# Patient Record
Sex: FEMALE | Race: WHITE
[De-identification: names, ages, dates, MRNs, and addresses within clinical notes are randomized per-mention and may not be internally consistent; named-entity substitution may affect disease eponyms.]

---

## 2017-03-20 NOTE — PCM.PREANE
Preanesthetic Assessment





- Procedure


Proposed Procedure: 





term pregnancy in active labor. Requested labor epidural by RN per Dr. Carcamo 

order.








- Anesthesia/Transfusion/Family Hx


Anesthesia History: Prior Anesthesia Without Reaction


Family History of Anesthesia Reaction: No


Transfusion History: No Prior Transfusion(s)


Intubation History: Unknown





- Review of Systems


General: Other (labor pains)


Pulmonary: No Symptoms


Cardiovascular: No Symptoms


Gastrointestinal: No symptoms, Other (hx of GERD with pregnancy)


Neurological: No Symptoms


Other: Reports: Thyroid Problems (hypothyroid on syntroid)





- Physical Assessment


Height: 5 ft 3 in


Weight: 130 lb


ASA Class: 2E


ROM/Head Extension: Full


Lungs: Normal respiratory effort


Cardiovascular: Regular Rate





- Lab


Values: 





 Laboratory Last Values











WBC  11.35 K/uL (4.0-11.0)  H  03/20/17  21:42    


 


RBC  4.23 M/uL (4.30-5.90)  L  03/20/17  21:42    


 


Hgb  13.9 g/dL (12.0-16.0)   03/20/17  21:42    


 


Hct  39.8 % (36.0-46.0)   03/20/17  21:42    


 


MCV  94.1 fL (80.0-98.0)   03/20/17  21:42    


 


MCH  32.9 pg (27.0-32.0)  H  03/20/17  21:42    


 


MCHC  34.9 g/dL (31.0-37.0)   03/20/17  21:42    


 


RDW Std Deviation  47.2 fl (28.0-62.0)   03/20/17  21:42    


 


RDW Coeff of Feng  14 % (11.0-15.0)   03/20/17  21:42    


 


Plt Count  142 K/uL (150-400)  L  03/20/17  21:42    


 


MPV  10.40 fL (7.40-12.00)   03/20/17  21:42    


 


Nucleated RBC %  0.0 /100WBC  03/20/17  21:42    


 


Nucleated RBCs #  0 K/uL  03/20/17  21:42    


 


POC Glucose  80 mg/dL ()   03/20/17  22:45    


 


Blood Type  O POSITIVE   03/20/17  21:42    


 


Antibody Screen  NEGATIVE   03/20/17  21:42    














- Allergies


Allergies/Adverse Reactions: 


 Allergies











Allergy/AdvReac Type Severity Reaction Status Date / Time


 


amoxicillin [Amoxicillin] Allergy  Hives Verified 03/20/17 21:32














- Blood


Blood Available: No


Product(s) Available: None





- Acknowledgements


Anesthesia Type Planned: Regional Block


Pt an Appropriate Candidate for the Planned Anesthesia: Yes


Alternatives and Risks of Anesthesia Discussed w Pt/Guardian: Yes


Pt/Guardian Understands and Agrees with Anesthesia Plan: Yes





PreAnesthesia Questionnaire


OB/GYN History: Reports: Pregnancy, Other (see below)


Other OB/BYN History: D & C x1


Endocrine/Metabolic History: Reports: Diabetes, gestational, Hypothyroidism





- Past Surgical History


HEENT Surgical History: Reports: Tonsillectomy





- SUBSTANCE USE


Smoking Status *Q: Never Smoker





- HOME MEDS


Home Medications: 


 Home Meds





Levothyroxine 112 mcg PO ACBREAKFAST 03/20/17 [History]


Prenatal Vit W-Ca,Fe,FA(<1 mg) [Prenatal Vitamins] 1 tab PO DAILY 03/20/17 [

History]











- CURRENT (IN HOUSE) MEDS


Current Meds: 





 Current Medications





Butorphanol Tartrate (Stadol)  1 mg IVPUSH Q1H PRN


   PRN Reason: Pain


Carboprost Tromethamine (Hemabate Ds)  250 mcg IM ASDIRECTED PRN


   PRN Reason: Post Partum Hemorrhage


Cefazolin Sodium (Ancef)  1 gm IM Q8H [facility]IAN[facility]


Cefazolin Sodium (Ancef)  1 gm IM Q8H MARIANNA


Lactated Ringer's (Ringers, Lactated)  1,000 mls @ 150 mls/hr IV ASDIRECTED MARIANNA


   Last Admin: 03/20/17 23:01 Dose:  999 mls/hr


Lidocaine HCl (Xylocaine 1%)  50 ml INJECT .ONCE PRN


   PRN Reason: Laceration repair


Methylergonovine Maleate (Methergine)  0.2 mg IM ASDIRECTED PRN


   PRN Reason: Post Partum Hemorrhage


Misoprostol (Cytotec)  200 mcg PO .ONCE PRN


   PRN Reason: Post Partum Hemorrhage


Nalbuphine HCl (Nubain)  10 mg IVPUSH Q1H PRN


   PRN Reason: Pain (severe 7-10)


   Stop: 03/20/17 23:34


Sodium Chloride (Saline Flush)  10 ml FLUSH ASDIRECTED PRN


   PRN Reason: Keep Vein Open


Sodium Chloride (Saline Flush)  2.5 ml FLUSH ASDIRECTED PRN


   PRN Reason: Keep Vein Open


Sterile Water (Sterile Water For Irrigation)  1,000 ml IRR ASDIRECTED PRN


   PRN Reason: delivery





Discontinued Medications





Oxytocin/Lactated Ringer's (Pitocin In Lr 30 Units/500 Ml)  30 unit in 500 mls 

@ 999 mls/hr IV ONETIME ONE


   Stop: 03/20/17 22:04


Cefazolin Sodium/Dextrose 2 gm (/ Premix)  50 mls @ 100 mls/hr IV ONETIME ONE


   Stop: 03/20/17 22:23


   Last Admin: 03/20/17 21:59 Dose:  100 mls/hr











Preanesthetic Assessment





- ANESTHESIA/TRANSFUSION/FAMILY HX


Anesthesia/Transfusion History: Prior Anesthesia (GA without complications)


Family History of Anesthesia Reaction: No





- PHYSICAL ASSESSMENT


Height: 5 ft 3 in


Weight: 130 lb





- LAB


Values: 





 Laboratory Last Values











WBC  11.35 K/uL (4.0-11.0)  H  03/20/17  21:42    


 


RBC  4.23 M/uL (4.30-5.90)  L  03/20/17  21:42    


 


Hgb  13.9 g/dL (12.0-16.0)   03/20/17  21:42    


 


Hct  39.8 % (36.0-46.0)   03/20/17  21:42    


 


MCV  94.1 fL (80.0-98.0)   03/20/17  21:42    


 


MCH  32.9 pg (27.0-32.0)  H  03/20/17  21:42    


 


MCHC  34.9 g/dL (31.0-37.0)   03/20/17  21:42    


 


RDW Std Deviation  47.2 fl (28.0-62.0)   03/20/17  21:42    


 


RDW Coeff of Feng  14 % (11.0-15.0)   03/20/17  21:42    


 


Plt Count  142 K/uL (150-400)  L  03/20/17  21:42    


 


MPV  10.40 fL (7.40-12.00)   03/20/17  21:42    


 


Nucleated RBC %  0.0 /100WBC  03/20/17  21:42    


 


Nucleated RBCs #  0 K/uL  03/20/17  21:42    


 


POC Glucose  80 mg/dL ()   03/20/17  22:45    


 


Blood Type  O POSITIVE   03/20/17  21:42    


 


Antibody Screen  NEGATIVE   03/20/17  21:42    














- ALLERGIES


Allergies/Adverse Reactions: 


 Allergies











Allergy/AdvReac Type Severity Reaction Status Date / Time


 


amoxicillin [Amoxicillin] Allergy  Hives Verified 03/20/17 21:32

## 2017-03-21 NOTE — PCM.PNPP
- General Info


Date of Service: 17


Functional Status: Reports: pain controlled, tolerating diet, ambulating





- Review of Systems


General: Reports: No Symptoms


HEENT: Reports: no symptoms


Pulmonary: Reports: no symptoms


Cardiovascular: Reports: No Symptoms


Gastrointestinal: Reports: No symptoms


Genitourinary: Reports: no symptoms


Musculoskeletal: Reports: no symptoms


Skin: Reports: no symptoms


Neurological: Reports: No Symptoms


Psychiatric: Reports: no symptoms





- General Info


Date of Service: 17





- Patient Data


Vital Signs - most recent: 


 Last Vital Signs











Temp  37.4 C   17 06:30


 


Pulse  71   17 06:30


 


Resp  18   17 06:30


 


BP  117/74   17 06:30


 


Pulse Ox  96   17 06:30











Weight - most recent: 68.039 kg


I&O - last 24 hours: 


 Intake & Output











 17





 22:59 06:59 14:59


 


Intake Total 50 3100 


 


Balance 50 3100 











Lab Results - last 24 hrs: 


 Laboratory Results - last 24 hr











  17 Range/Units





  21:42 21:42 22:45 


 


WBC  11.35 H    (4.0-11.0)  K/uL


 


RBC  4.23 L    (4.30-5.90)  M/uL


 


Hgb  13.9    (12.0-16.0)  g/dL


 


Hct  39.8    (36.0-46.0)  %


 


MCV  94.1    (80.0-98.0)  fL


 


MCH  32.9 H    (27.0-32.0)  pg


 


MCHC  34.9    (31.0-37.0)  g/dL


 


RDW Std Deviation  47.2    (28.0-62.0)  fl


 


RDW Coeff of Feng  14    (11.0-15.0)  %


 


Plt Count  142 L    (150-400)  K/uL


 


MPV  10.40    (7.40-12.00)  fL


 


Nucleated RBC %  0.0    /100WBC


 


Nucleated RBCs #  0    K/uL


 


Sodium     (136-146)  mmol/L


 


Potassium     (3.5-5.1)  mmol/L


 


Chloride     ()  mmol/L


 


Carbon Dioxide     (21-31)  mmol/L


 


BUN     (6.0-23.0)  mg/dL


 


Creatinine     (0.6-1.5)  mg/dL


 


Est Cr Clr Drug Dosing     mL/min


 


Estimated GFR (MDRD)     ml/min


 


Glucose     ()  mg/dL


 


POC Glucose    80  ()  mg/dL


 


Calcium     (8.8-10.8)  mg/dL


 


Blood Type   O POSITIVE   


 


Antibody Screen   NEGATIVE   














  17 Range/Units





  05:08 05:08 


 


WBC    (4.0-11.0)  K/uL


 


RBC    (4.30-5.90)  M/uL


 


Hgb   12.8  (12.0-16.0)  g/dL


 


Hct   37.8  (36.0-46.0)  %


 


MCV    (80.0-98.0)  fL


 


MCH    (27.0-32.0)  pg


 


MCHC    (31.0-37.0)  g/dL


 


RDW Std Deviation    (28.0-62.0)  fl


 


RDW Coeff of Feng    (11.0-15.0)  %


 


Plt Count    (150-400)  K/uL


 


MPV    (7.40-12.00)  fL


 


Nucleated RBC %    /100WBC


 


Nucleated RBCs #    K/uL


 


Sodium  139   (136-146)  mmol/L


 


Potassium  3.7   (3.5-5.1)  mmol/L


 


Chloride  110   ()  mmol/L


 


Carbon Dioxide  21   (21-31)  mmol/L


 


BUN  13   (6.0-23.0)  mg/dL


 


Creatinine  0.6   (0.6-1.5)  mg/dL


 


Est Cr Clr Drug Dosing  111.35   mL/min


 


Estimated GFR (MDRD)  > 60.0   ml/min


 


Glucose  85   ()  mg/dL


 


POC Glucose    ()  mg/dL


 


Calcium  7.9 L   (8.8-10.8)  mg/dL


 


Blood Type    


 


Antibody Screen    











Med Orders - Current: 


 Current Medications





Acetaminophen (Tylenol Extra Strength)  1,000 mg PO Q4H PRN


   PRN Reason: Pain


Benzocaine/Menthol (Dermoplast Pain Relief 20%-0.5% Spray)  78 gm TOP 

ASDIRECTED PRN


   PRN Reason: Perineal Comfort Measure


   Last Admin: 17 06:29 Dose:  1 canister


Bisacodyl (Dulcolax)  10 mg RECTAL .ONCE PRN


   PRN Reason: Constipation


Docusate Sodium (Colace)  100 mg PO BID PRN


   PRN Reason: Constipation


Emollient Ointment (Lansinoh Hpa)  0 gm TOP ASDIRECTED PRN


   PRN Reason: Sore Nipples


   Last Admin: 17 06:29 Dose:  1 tube


Ibuprofen (Motrin)  800 mg PO Q6H PRN


   PRN Reason: Pain


   Last Admin: 17 06:30 Dose:  800 mg


Oxycodone HCl (Oxycodone)  5 mg PO Q2H PRN


   PRN Reason: Pain


Witch Hazel (Tucks)  1 pad TOP ASDIRECTED PRN


   PRN Reason: comfort care


   Last Admin: 17 06:28 Dose:  1 canister





Discontinued Medications





Butorphanol Tartrate (Stadol)  1 mg IVPUSH Q1H PRN


   PRN Reason: Pain


Carboprost Tromethamine (Hemabate Ds)  250 mcg IM ASDIRECTED PRN


   PRN Reason: Post Partum Hemorrhage


Cefazolin Sodium (Ancef)  1 gm IM Q8H MARIANNA


Cefazolin Sodium (Ancef)  1 gm IM Q8H [facility]


Fentanyl (Sublimaze) Confirm Administered Dose 100 mcg .ROUTE .STK-MED ONE


   Stop: 17 23:27


Lactated Ringer's (Ringers, Lactated)  1,000 mls @ 150 mls/hr IV ASDIRECTED [facility]


   Last Admin: 17 00:01 Dose:  150 mls/hr


Oxytocin/Lactated Ringer's (Pitocin In Lr 30 Units/500 Ml)  30 unit in 500 mls 

@ 999 mls/hr IV ONETIME ONE


   Stop: 17 22:04


   Last Admin: 17 00:46 Dose:  250 mls/hr


Cefazolin Sodium/Dextrose 2 gm (/ Premix)  50 mls @ 100 mls/hr IV ONETIME ONE


   Stop: 17 22:23


   Last Admin: 17 21:59 Dose:  100 mls/hr


Ropivacaine (Naropin 0.2%) Confirm Administered Dose 100 mls @ as directed 

.ROUTE .STK-MED ONE


   Stop: 17 23:27


Oxytocin/Lactated Ringer's (Pitocin In Lr 30 Units/500 Ml) Confirm Administered 

Dose 30 unit in 500 mls @ as directed .ROUTE .STK-MED ONE


   Stop: 17 00:37


Lidocaine HCl (Xylocaine 1%)  50 ml INJECT .ONCE PRN


   PRN Reason: Laceration repair


Methylergonovine Maleate (Methergine)  0.2 mg IM ASDIRECTED PRN


   PRN Reason: Post Partum Hemorrhage


Misoprostol (Cytotec)  200 mcg PO .ONCE PRN


   PRN Reason: Post Partum Hemorrhage


Nalbuphine HCl (Nubain)  10 mg IVPUSH Q1H PRN


   PRN Reason: Pain (severe 7-10)


   Stop: 17 23:34


Ropivacaine (Naropin 0.2%) Confirm Administered Dose 20 ml .ROUTE .STK-MED ONE


   Stop: 17 23:27


Sodium Chloride (Saline Flush)  10 ml FLUSH ASDIRECTED PRN


   PRN Reason: Keep Vein Open


Sodium Chloride (Saline Flush)  2.5 ml FLUSH ASDIRECTED PRN


   PRN Reason: Keep Vein Open


Sterile Water (Sterile Water For Irrigation)  1,000 ml IRR ASDIRECTED PRN


   PRN Reason: delivery


   Last Admin: 17 00:10 Dose:  1,000 ml











- Infant Interaction


Infant Disposition, Postpartum:  at Bedside


Support Person: 





- Postpartum Recovery Exam


Fundal Tone: Firm


Fundal Level: At Umbilicus


Fundal Placement: Midline


Lochia Amount: Scant, Small


Lochia Color: Rubra/Red


Perineum Description: Intact, Minimal Bruising/Swelling


Episiotomy/Laceration: Approximated


Bladder Status: Indwelling Catheter in Place





- Exam


General: alert, oriented


HEENT: Pupils equal


Neck: supple


Lungs: Clear to auscultation, Normal respiratory effort


Abdomen: soft, no tenderness, no distension


Extremities: no edema


Skin: warm, dry, intact


Wound/Incisions: healing well


Neurological: no new focal deficit


Psy/Mental Status: alert, normal affect, normal mood





- Problem List & Annotations


(1) Gestational diabetes mellitus (GDM) in childbirth


SNOMED Code(s): 00525937


   Code(s): O24.429 - GESTATIONAL DIABETES MELLITUS IN CHILDBIRTH, UNSP CONTROL

   Status: Acute   Current Visit: Yes   





(2) Vaginal delivery


SNOMED Code(s): 373377504


   Code(s): O80 - ENCOUNTER FOR FULL-TERM UNCOMPLICATED DELIVERY   Status: 

Acute   Current Visit: No   





- Problem List Review


Problem List Initiated/Reviewed/Updated: Yes





- My Orders


Last 24 Hours: 


My Active Orders





17 21:33


Fetal Heart Tones [RC] CONTINUOUS 


Fetal Non Stress Test [RC] PER UNIT ROUTINE 


May Shower [RC] ASDIRECTED 


Notify Provider [RC] PRN 


Up ad Jeri [RC] ASDIRECTED 


Vaginal Exam [RC] PRN 


Vital Signs [RC] PER UNIT ROUTINE 





17 00:39


Patient Status [ADT] Routine 


May Shower [RC] ASDIRECTED 


Up ad Jeri [RC] ASDIRECTED 


Urinary Catheter Removal [RC] Per Unit Routine 


Vital Signs [RC] PER UNIT ROUTINE 


Acetaminophen [Tylenol Extra Strength]   1,000 mg PO Q4H PRN 


Benzocaine/Menthol [Dermoplast Pain Relief 20%-0.5% Spray]   78 gm TOP 

ASDIRECTED PRN 


Bisacodyl [Dulcolax]   10 mg RECTAL .ONCE PRN 


Docusate Sodium [Colace]   100 mg PO BID PRN 


Ibuprofen [Motrin]   800 mg PO Q6H PRN 


Lanolin [Lansinoh HPA]   See Dose Instructions  TOP ASDIRECTED PRN 


Witch Hazel [Tucks]   1 pad TOP ASDIRECTED PRN 


oxyCODONE   5 mg PO Q2H PRN 


Assess Lochia [WOMSER] Per Unit Routine 


Assess Uterine Involution [WOMSER] Per Unit Routine 


Peripheral IV Discontinue [OM.PC] Routine 


Resuscitation Status Routine 





17 00:40


Perineal Care [OM.PC] Per Unit Routine 





17 Breakfast


Regular Diet [DIET] 





17 05:11


HEMOGLOBIN/HEMATOCRIT,HH [HEME] Timed 














- Assessment


Assessment:: 


PPD #1 after  stable, minimal lochia, no complaints,








- Plan


Plan:: 


Remove catheter, ambulate continue postpartum care.

## 2017-03-21 NOTE — PCM.SN
- Free Text/Narrative


Note: 





Patient chart reviewed. See anesthesia documentation.Patient in active labor 

and on floor on all fours. Rapidly discussed epidural plan and need to get into 

bed as previously directed on the phone with nursing staff. Her back was 

prepared with betadine from the [employer] kit. sterile drape applied. L45 

interspace midline palpated and injected with 5 ml !%lidocaine. 17g Touhy 

needle then passed into the interspace....reset twice since bone encountered. 

successful loss of resistance then found, 2 ml sterile saline then injected, 

catheter then place to 18cm known by having removed the needle over the 

catheter. The catheter was secured with a provided sponge and transparent 

dressing. Dosing via catheter then occurred with 12ml 0.2%ropivacaine 

containing 100mcg fentanyl over the next 5-10 minutes. Completion of a full 

20ml Ropivacaine then was completed over the next 15-20 min. Exam by RN then 

indicated complete dilation had occurred and Dr. Carcamo was summoned. The pump 

will be connected if delivery does not occur in the next 30-45 minutes. I will 

remain at the bedside.

## 2017-03-21 NOTE — [id number]
Post Anesthesia Note





- EVALUATION WITHIN 48HRS OF ANESTHETIC


Vital Signs in Normal Range: Yes


Patient Participated in Evaluation: Yes


Respiratory Function Stable: Yes


Airway Patent: Yes


Cardiovascular Function Stable: Yes


Hydration Status Stable: Yes


Pain Control Satisfactory: Yes


Nausea and Vomiting Control Satisfactory: Yes


Mental Status Recovered: Yes





- COMMENTS/OBSERVATIONS


Free Text/Narrative:: 





Pressure bandage over epidural access site was removed. Skin and area dry, non-

erythematous, and not tender per patient response.

## 2017-03-21 NOTE — OR
SURGEON:

Joan Carcamo M.D.

 

DATE OF PROCEDURE:  2017

 

DELIVERY NOTE

 

PREOPERATIVE DIAGNOSES:

1. Thirty-eight and 3/7th week intrauterine pregnancy.

2. Group B strep positive.

3. Gestational diabetes mellitus, type A1.

4. Fetal renal dilatation.

 

POSTOPERATIVE DIAGNOSES:

1. Thirty-eight and 3/7th week intrauterine pregnancy.

2. Group B strep positive.

3. Gestational diabetes mellitus, type A1.

4. Fetal renal dilatation.

 

PROCEDURE:

Term spontaneous vaginal delivery with repair of periurethral laceration.

 

ANESTHESIA:

Epidural.

 

ESTIMATED BLOOD LOSS:

Less than 300 mL.

 

FINDINGS:

Liveborn female, Apgar score 8 and 9, weighing 2870 g,  placenta spontaneous,

Schultze intact with 3 vessels.  Upon inspection of the pelvis and perineum,

there were no vaginal sidewall, cervical, rectal, or perineal lacerations.

There was a small anterior periurethral laceration, which was repaired.

 

COMPLICATIONS:

None known.

 

DISPOSITION:

Mother and baby are in LDRP in good condition.

 

BRIEF HISTORY:

This is a 31-year-old female.  She is G4, P1-0-2-1.  She presents at 38 and

3/7th weeks gestation in active spontaneous labor, category 1 fetal heart tones.

She was started on Ancef for group B strep prophylaxis due to allergy to

penicillin.  She was admitted to Labor and Delivery.  She progressed to 8 cm

dilatation.  At this point, she requested epidural, which she did receive and

she progressed shortly thereafter to complete.

 

DESCRIPTION OF PROCEDURE:

With the patient in dorsal lithotomy position, with rupture of membranes, the

patient pushed over 2 contractions to a 5+ station, at which time, the head was

delivered spontaneously and atraumatically over the perineum with support with

subsequent delivery of the infant's shoulders and body without any difficulty.

The infant was bulb suctioned by nose and mouth and the infant was handed to the

mother in the presence of the nurse and Dr. Chavarria, who were present for

delivery.  The infant was a liveborn female, Apgar score 8 and 9, weighing 2870

g.  After the cord had ceased to pulsate, it was doubly clamped and cut.  Cord

blood was collected for cord ABGs as well as routine cord blood sampling.

Pitocin was initiated after delivery of the infant to assist with delivery of

the placenta, which was delivered spontaneously.  Daljittze intact with 3

vessels.  Upon inspection of the pelvis and perineum, there were no vaginal

sidewall, cervical, rectal, or perineal lacerations.  There was a small anterior

periurethral laceration, therefore, the urethral area was cleansed with

Betadine.  A Horton catheter was placed and the figure-of-eight suture was placed

for hemostasis and to reapproximate the epithelium.  The single suture

adequately reapproximated the laceration.  With this being complete, sponge,

needle, and instrument count were correct.  There were no known complications.

Infant and mother are in LDRP in good condition.

 

 

GUERRERO العراقي

DD:  2017 [id number]

DT:  2017 01:43:32

Job #:  [id number]

## 2017-03-22 NOTE — PCM.PNPP
- General Info


Date of Service: 17


Functional Status: Reports: pain controlled, tolerating diet, ambulating, 

urinating





- Review of Systems


General: Denies: Fever, Fatigue, Chills


HEENT: Denies: headaches


Pulmonary: Denies: shortness of breath, pleuritic chest pain, cough


Cardiovascular: Denies: Chest Pain


Gastrointestinal: Denies: Abdominal pain


Genitourinary: Denies: dysuria, incontinence


Neurological: Denies: Confusion


Psychiatric: Denies: depression, mood lability, anxiety





- General Info


Date of Service: 17





- Patient Data


Vital Signs - most recent: 


 Last Vital Signs











Temp  37.2 C   17 04:00


 


Pulse  69   17 04:00


 


Resp  18   17 04:00


 


BP  112/70   17 04:00


 


Pulse Ox  98   17 19:00











Weight - most recent: 150 lb


Lab Results - last 24 hrs: 


 Laboratory Results - last 24 hr











  17 Range/Units





  04:43 


 


Hgb  13.1  (12.0-16.0)  g/dL


 


Hct  39.4  (36.0-46.0)  %











Med Orders - Current: 


 Current Medications





Acetaminophen (Tylenol Extra Strength)  1,000 mg PO Q4H PRN


   PRN Reason: Pain


Benzocaine/Menthol (Dermoplast Pain Relief 20%-0.5% Spray)  78 gm TOP 

ASDIRECTED PRN


   PRN Reason: Perineal Comfort Measure


   Last Admin: 17 06:29 Dose:  1 canister


Bisacodyl (Dulcolax)  10 mg RECTAL .ONCE PRN


   PRN Reason: Constipation


Docusate Sodium (Colace)  100 mg PO BID PRN


   PRN Reason: Constipation


Emollient Ointment (Lansinoh Hpa)  0 gm TOP ASDIRECTED PRN


   PRN Reason: Sore Nipples


   Last Admin: 17 06:29 Dose:  1 tube


Ibuprofen (Motrin)  800 mg PO Q6H PRN


   PRN Reason: Pain


   Last Admin: 17 06:30 Dose:  800 mg


Oxycodone HCl (Oxycodone)  5 mg PO Q2H PRN


   PRN Reason: Pain


Witch Hazel (Tucks)  1 pad TOP ASDIRECTED PRN


   PRN Reason: comfort care


   Last Admin: 17 06:28 Dose:  1 canister





Discontinued Medications





Butorphanol Tartrate (Stadol)  1 mg IVPUSH Q1H PRN


   PRN Reason: Pain


Carboprost Tromethamine (Hemabate Ds)  250 mcg IM ASDIRECTED PRN


   PRN Reason: Post Partum Hemorrhage


Cefazolin Sodium (Ancef)  1 gm IM Q8H Counts include 234 beds at the [facility]


Cefazolin Sodium (Ancef)  1 gm IM Q8H Counts include 234 beds at the [facility]


Fentanyl (Sublimaze) Confirm Administered Dose 100 mcg .ROUTE .STK-MED ONE


   Stop: 17 23:27


Lactated Ringer's (Ringers, Lactated)  1,000 mls @ 150 mls/hr IV ASDIRECTED Counts include 234 beds at the [facility]


   Last Admin: 17 00:01 Dose:  150 mls/hr


Oxytocin/Lactated Ringer's (Pitocin In Lr 30 Units/500 Ml)  30 unit in 500 mls 

@ 999 mls/hr IV ONETIME ONE


   Stop: 17 22:04


   Last Admin: 17 00:46 Dose:  250 mls/hr


Cefazolin Sodium/Dextrose 2 gm (/ Premix)  50 mls @ 100 mls/hr IV ONETIME ONE


   Stop: 17 22:23


   Last Admin: 17 21:59 Dose:  100 mls/hr


Ropivacaine (Naropin 0.2%) Confirm Administered Dose 100 mls @ as directed 

.ROUTE .STK-MED ONE


   Stop: 17 23:27


Oxytocin/Lactated Ringer's (Pitocin In Lr 30 Units/500 Ml) Confirm Administered 

Dose 30 unit in 500 mls @ as directed .ROUTE .STK-MED ONE


   Stop: 17 00:37


Lidocaine HCl (Xylocaine 1%)  50 ml INJECT .ONCE PRN


   PRN Reason: Laceration repair


Methylergonovine Maleate (Methergine)  0.2 mg IM ASDIRECTED PRN


   PRN Reason: Post Partum Hemorrhage


Misoprostol (Cytotec)  200 mcg PO .ONCE PRN


   PRN Reason: Post Partum Hemorrhage


Nalbuphine HCl (Nubain)  10 mg IVPUSH Q1H PRN


   PRN Reason: Pain (severe 7-10)


   Stop: 17 23:34


Ropivacaine (Naropin 0.2%) Confirm Administered Dose 20 ml .ROUTE .STK-MED ONE


   Stop: 17 23:27


Sodium Chloride (Saline Flush)  10 ml FLUSH ASDIRECTED PRN


   PRN Reason: Keep Vein Open


Sodium Chloride (Saline Flush)  2.5 ml FLUSH ASDIRECTED PRN


   PRN Reason: Keep Vein Open


Sterile Water (Sterile Water For Irrigation)  1,000 ml IRR ASDIRECTED PRN


   PRN Reason: delivery


   Last Admin: 17 00:10 Dose:  1,000 ml











- Infant Interaction


Infant Disposition, Postpartum:  at Bedside


Support Person: 





- Postpartum Recovery Exam


Fundal Tone: Firm


Fundal Level: 1 Fingerbreadths Below Umbilicus


Fundal Placement: Midline


Lochia Amount: Scant


Lochia Color: Rubra/Red


Perineum Description: Intact, Minimal Bruising/Swelling


Episiotomy/Laceration: Approximated


Bladder Status: Voiding


Urinary Elimination: Voided


Other Urinary Elimination, Postpartum: catheter removed.





- Exam


General: alert


HEENT: Pupils equal


Neck: supple


Lungs: Clear to auscultation, Normal respiratory effort


Cardiovascular: Regular Rate, Regular Rhythm


Abdomen: bowel sounds present, soft, no tenderness, no distension


Extremities: no calf tenderness, edema


Neurological: no new focal deficit


Psy/Mental Status: alert, normal affect, normal mood





- Problem List & Annotations


(1) Gestational diabetes mellitus (GDM) in childbirth


SNOMED Code(s): 74450011


   Code(s): O24.429 - GESTATIONAL DIABETES MELLITUS IN CHILDBIRTH, UNSP CONTROL

   Status: Acute   Current Visit: Yes   





(2) Vaginal delivery


SNOMED Code(s): 427892543


   Code(s): O80 - ENCOUNTER FOR FULL-TERM UNCOMPLICATED DELIVERY   Status: 

Acute   Current Visit: No   





- Problem List Review


Problem List Initiated/Reviewed/Updated: Yes





- Assessment


Assessment:: 


PPD #2 after  stable with no complaints








- Plan


Plan:: 


Discharge instructions reviewed


Nothing in the vagina for 6 weeks


Bleeding and infection precautions reviewed


Continun PNV


OTC pain meds as needed


Follow up in 6 weeks

## 2023-04-15 ENCOUNTER — HOSPITAL ENCOUNTER (EMERGENCY)
Dept: HOSPITAL 56 - MW.ED | Age: 38
Discharge: HOME | End: 2023-04-15
Payer: COMMERCIAL

## 2023-04-15 VITALS — HEART RATE: 99 BPM | SYSTOLIC BLOOD PRESSURE: 145 MMHG | DIASTOLIC BLOOD PRESSURE: 81 MMHG

## 2023-04-15 DIAGNOSIS — S61.211A: Primary | ICD-10-CM

## 2023-04-15 DIAGNOSIS — Z23: ICD-10-CM
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
## 2023-04-15 DIAGNOSIS — E03.9: ICD-10-CM

## 2023-04-15 DIAGNOSIS — Z79.899: ICD-10-CM

## 2023-04-15 DIAGNOSIS — Z88.0: ICD-10-CM

## 2023-04-24 ENCOUNTER — HOSPITAL ENCOUNTER (EMERGENCY)
Dept: HOSPITAL 56 - MW.ED | Age: 38
Discharge: LEFT BEFORE DISCHARGE | End: 2023-04-24
Payer: COMMERCIAL

## 2023-04-24 VITALS — SYSTOLIC BLOOD PRESSURE: 132 MMHG | DIASTOLIC BLOOD PRESSURE: 82 MMHG | HEART RATE: 108 BPM

## 2023-04-24 DIAGNOSIS — Z53.1: Primary | ICD-10-CM
